# Patient Record
Sex: FEMALE | Employment: OTHER | URBAN - METROPOLITAN AREA
[De-identification: names, ages, dates, MRNs, and addresses within clinical notes are randomized per-mention and may not be internally consistent; named-entity substitution may affect disease eponyms.]

---

## 2024-06-21 RX ORDER — LOSARTAN POTASSIUM 25 MG/1
25 TABLET ORAL DAILY
Qty: 90 TABLET | OUTPATIENT
Start: 2024-06-21

## 2024-07-12 ENCOUNTER — TELEPHONE (OUTPATIENT)
Age: 69
End: 2024-07-12

## 2024-07-12 NOTE — TELEPHONE ENCOUNTER
----- Message from Johanna Jernigan RN sent at 7/11/2024  3:48 PM EDT -----  Regarding: Cardiac clearance  Hey,    This patient left a voicemail stating that she is having spinal surgery (ANTERIOR CERVICAL DISCECTOMY & FUSION  WITH PLATING C4-C7) next Wednesday and needs clearance to hold aspirin and lasix.    Please advise.    Johanna Jernigan RN  Clinical Coordinator   [facility] at [facility]

## 2024-07-12 NOTE — TELEPHONE ENCOUNTER
Verbal order and read back per Omar Johnson MD  Low to moderate risk for procedure on Wednesday the 17th .   Can hold aspirin starting today   Can hold lasix the morning of procedure.     Left message on voice mail

## 2024-07-15 NOTE — TELEPHONE ENCOUNTER
Patient left voicemail requesting call back.     Explained to her the following:  Hold aspirin starting today and resume following procedure.  Hold lasix the morning of procedure. Resume following procedure.    She verbalized understanding.

## 2024-07-17 PROBLEM — M48.02 CERVICAL STENOSIS OF SPINE: Status: ACTIVE | Noted: 2024-07-17

## 2024-07-24 ENCOUNTER — OFFICE VISIT (OUTPATIENT)
Age: 69
End: 2024-07-24

## 2024-07-24 VITALS
HEIGHT: 69 IN | DIASTOLIC BLOOD PRESSURE: 70 MMHG | BODY MASS INDEX: 29.33 KG/M2 | OXYGEN SATURATION: 100 % | WEIGHT: 198 LBS | SYSTOLIC BLOOD PRESSURE: 110 MMHG | HEART RATE: 78 BPM

## 2024-07-24 DIAGNOSIS — I77.4 CELIAC ARTERY COMPRESSION SYNDROME (HCC): ICD-10-CM

## 2024-07-24 DIAGNOSIS — R94.39 ABNORMAL RESULT OF OTHER CARDIOVASCULAR FUNCTION STUDY: ICD-10-CM

## 2024-07-24 DIAGNOSIS — R06.02 SHORTNESS OF BREATH: ICD-10-CM

## 2024-07-24 DIAGNOSIS — E78.5 HYPERLIPIDEMIA, UNSPECIFIED HYPERLIPIDEMIA TYPE: ICD-10-CM

## 2024-07-24 DIAGNOSIS — I25.10 ATHEROSCLEROSIS OF NATIVE CORONARY ARTERY OF NATIVE HEART WITHOUT ANGINA PECTORIS: Primary | ICD-10-CM

## 2024-07-24 DIAGNOSIS — R00.2 PALPITATIONS: ICD-10-CM

## 2024-07-24 DIAGNOSIS — I10 ESSENTIAL (PRIMARY) HYPERTENSION: ICD-10-CM

## 2024-07-24 NOTE — PROGRESS NOTES
Brad Hays presents today for No chief complaint on file.      Brad Hays preferred language for health care discussion is english/other.    Is someone accompanying this pt? yes    Is the patient using any DME equipment during OV? yes    Depression Screening:  Depression: Not at risk (4/17/2024)    PHQ-2     PHQ-2 Score: 0        Learning Assessment:  Who is the primary learner? Patient    What is the preferred language for health care of the primary learner? ENGLISH    How does the primary learner prefer to learn new concepts? DEMONSTRATION    Answered By patient    Relationship to Learner SELF           Pt currently taking Anticoagulant therapy? no    Pt currently taking Antiplatelet therapy ? Aspirin 81 mg daily      Coordination of Care:  1. Have you been to the ER, urgent care clinic since your last visit? Hospitalized since your last visit? no    2. Have you seen or consulted any other health care providers outside of the [facility] since your last visit? Include any pap smears or colon screening. no

## 2024-07-24 NOTE — PROGRESS NOTES
HISTORY OF PRESENT ILLNESS  Brad Hays  69 y.o. female     Chief Complaint   Patient presents with    Follow-up     6 month       ASSESSMENT and PLAN    The primary encounter diagnosis was Atherosclerosis of native coronary artery of native heart without angina pectoris. Diagnoses of Essential (primary) hypertension, Hyperlipidemia, unspecified hyperlipidemia type, Shortness of breath, Palpitations, Celiac artery compression syndrome (HCC), and Abnormal result of other cardiovascular function study were also pertinent to this visit.    Ms. Brad Covarrubias has known history of CAD.  She had an episode of chest pains, heaviness back in October 2020.  She was seen by her PCP and was sent directly to [facility].  She was noted to have ACS.  Ultimately, she was noted to have 95% RCA lesion which was stented to residual 0% using EMRE.  She also has history of 2-week event monitor for palpitations.  She does not know the results of the event monitor or her echocardiogram.  She is a previous smoker but none since 2011.  She has history of low back pain and sciatica.  She had nuclear scan done in October 2021.  This revealed EF 43% with global hypokinesis without significant reversible defect.  In August 2022, her nuclear scan showed EF 43% with mild 3 TID.  She had mild, small basal anteroseptal and inferoseptal fixed defect without reversible defect.  Her echocardiogram showed normal EF 55-60% with mild MR.  Because of persistent chest pain, she underwent coronary angiography in December 2022 which revealed widely patent RCA stent without significant occlusive disease greater than 50%.  Continued medical therapy was recommended.  Her echocardiogram done in January 2024 revealed EF 45-50% with mild global hypokinesis.  RVSP was measured at 36 mmHg.  In July 2024, she underwent spine surgery for spinal stenosis.  Medication regimen reviewed and current cardiac regimen will be continued.  All new testing since

## 2024-08-29 RX ORDER — FUROSEMIDE 20 MG/1
20 TABLET ORAL DAILY
Qty: 90 TABLET | Refills: 3 | OUTPATIENT
Start: 2024-08-29

## 2024-09-03 RX ORDER — FUROSEMIDE 20 MG/1
20 TABLET ORAL DAILY
Qty: 90 TABLET | Refills: 3 | OUTPATIENT
Start: 2024-09-03

## 2024-09-04 ENCOUNTER — TELEPHONE (OUTPATIENT)
Age: 69
End: 2024-09-04

## 2024-09-04 RX ORDER — POTASSIUM CHLORIDE 750 MG/1
10 CAPSULE, EXTENDED RELEASE ORAL 2 TIMES DAILY
COMMUNITY

## 2024-09-04 RX ORDER — FUROSEMIDE 40 MG
40 TABLET ORAL DAILY
Qty: 90 TABLET | Refills: 3 | Status: CANCELLED | OUTPATIENT
Start: 2024-09-04

## 2024-09-04 RX ORDER — FUROSEMIDE 40 MG
40 TABLET ORAL DAILY
Qty: 90 TABLET | Refills: 3 | Status: SHIPPED | OUTPATIENT
Start: 2024-09-04

## 2024-09-04 NOTE — TELEPHONE ENCOUNTER
----- Message from RIKI EDUARDO RN sent at 9/4/2024  4:50 AM EDT -----  Regarding: RE: meds  If she calls again please tell her that Dr Coley took it off her medication list when she was in hospital.  ----- Message -----  From: Elidia Martínez  Sent: 9/3/2024   3:05 PM EDT  To: Amanda Guevara RN  Subject: meds                                             Pt called stating the pharmacy notified her the prescription for her lasix we declined. She is wondering why. Please advise  [phone]

## 2024-09-04 NOTE — TELEPHONE ENCOUNTER
Called patient to make her aware that lasix was discontinued when she was in the hospital. She stated that she is having swelling to the bilateral lower extremities and abdomen. She is also having shortness of breath. She drinks 3 bottles of water daily and tries to limit sodium intake. She does not weigh herself daily.     She also stated that she takes her blood pressure daily and it has been elevated. Her systolic runs 150-170's. Reviewed patient's medications. She stated that she was only taking carvedilol once daily. Advised her that she should be taking it twice daily. She verbalized understanding.     Will discuss with Melvi.

## 2024-09-04 NOTE — TELEPHONE ENCOUNTER
Verbal order and read back per HI Yanez NP  Start lasix 40 mg once daily.  Make sure patient is taking potassium 20 meq daily.    This has been fully explained to the patient, who indicates understanding.  Patient stated that she is still taking potassium.  Refill request sent to provider.

## 2024-09-17 ENCOUNTER — TELEPHONE (OUTPATIENT)
Age: 69
End: 2024-09-17

## 2024-11-06 RX ORDER — PREGABALIN 100 MG/1
100 CAPSULE ORAL DAILY
COMMUNITY
Start: 2024-08-26

## 2024-11-06 RX ORDER — MULTIVIT WITH MINERALS/LUTEIN
2000 TABLET ORAL DAILY
COMMUNITY

## 2024-11-06 RX ORDER — CYCLOBENZAPRINE HCL 10 MG
10 TABLET ORAL DAILY PRN
COMMUNITY
Start: 2024-08-13

## 2024-11-06 RX ORDER — ATORVASTATIN CALCIUM 40 MG/1
40 TABLET, FILM COATED ORAL DAILY
COMMUNITY

## 2024-11-06 RX ORDER — OXYCODONE AND ACETAMINOPHEN 5; 325 MG/1; MG/1
1 TABLET ORAL EVERY 6 HOURS PRN
COMMUNITY
Start: 2024-08-20

## 2024-11-06 RX ORDER — METFORMIN HYDROCHLORIDE 500 MG/1
500 TABLET, EXTENDED RELEASE ORAL
COMMUNITY
Start: 2024-09-19

## 2024-11-06 NOTE — PROGRESS NOTES
Instructions for your procedure at [facility]      Today's Date: 11/6/2024      Patient's Name: Brda Hays      Procedure Date: November 14, 2024        Please enter the main entrance of the hospital and check-in at the  located in the lobby.      Do NOT eat or drink anything, including candy, gum, or ice chips after midnight prior to your procedure, unless it is part of your prep.  Brush your teeth before coming to the hospital.You may swish with water, but do not swallow.  No smoking/Vaping/E-Cigarettes 24 hours prior to the day of procedure.  No alcohol 24 hours prior to the day of procedure.  No recreational drugs for one week prior to the day of procedure.  Bring Photo ID, Insurance information, and Co-pay if required on day of procedure.  Bring in pertinent legal documents, such as, Medical Power of , DNR, Advance Directive, etc.  Leave all other valuables, including money/purse, at home.  Remove jewelry, including ALL body piercings, nail polish, acrylic nails, and makeup (including mascara); no lotions, powders, deodorant, and/or perfume/cologne/after shave on the skin.  Glasses and dentures may be worn to the hospital.  They must be removed prior to procedure. Please bring case/container for glasses or dentures.  11. Contacts should not be worn on day of procedure.   12. Call the office ([phone]) if you have symptoms of a cold or illness within 24-48 hours prior to your procedure.   13. AN ADULT (relative or friend 18 years or older) MUST DRIVE YOU HOME AFTER YOUR PROCEDURE.   14. Please make arrangements for a responsible adult (18 years or older) to be with you for 24 hours after your procedure.   15. TWO VISITORS will be allowed in the waiting area during your procedure.       Special Instructions:      Bring list of CURRENT medications.  Follow instructions from the office regarding Bowel Prep, Vitamins, Iron, Blood Thinners, Insulin, Seizure,  and Blood Pressure/Heart medications.    If you have a history of recreational drug use, you may be required to submit a urine sample for drug testing the day of your procedure, as some recreational drugs can interact with anesthetics and increase your surgical risk.    Any questions regarding prep, please call the office at [phone].    For any questions or concerns on the day of procedure, please call the Endo Suite at [phone].    These surgical instructions were reviewed with Brad Hays during the PAT phone call.

## 2024-11-13 ENCOUNTER — ANESTHESIA EVENT (OUTPATIENT)
Facility: HOSPITAL | Age: 69
End: 2024-11-13
Payer: MEDICARE

## 2024-11-14 ENCOUNTER — HOSPITAL ENCOUNTER (OUTPATIENT)
Facility: HOSPITAL | Age: 69
Setting detail: OUTPATIENT SURGERY
Discharge: HOME | End: 2024-11-14
Attending: INTERNAL MEDICINE | Admitting: INTERNAL MEDICINE
Payer: MEDICARE

## 2024-11-14 ENCOUNTER — ANESTHESIA (OUTPATIENT)
Facility: HOSPITAL | Age: 69
End: 2024-11-14
Payer: MEDICARE

## 2024-11-14 VITALS
HEIGHT: 70 IN | WEIGHT: 182 LBS | DIASTOLIC BLOOD PRESSURE: 65 MMHG | TEMPERATURE: 97.9 F | BODY MASS INDEX: 26.05 KG/M2 | RESPIRATION RATE: 13 BRPM | OXYGEN SATURATION: 100 % | SYSTOLIC BLOOD PRESSURE: 157 MMHG | HEART RATE: 79 BPM

## 2024-11-14 LAB
GLUCOSE BLD STRIP.AUTO-MCNC: 106 MG/DL (ref 70–110)
GLUCOSE BLD STRIP.AUTO-MCNC: 72 MG/DL (ref 70–110)
GLUCOSE BLD STRIP.AUTO-MCNC: 78 MG/DL (ref 70–110)
GLUCOSE BLD STRIP.AUTO-MCNC: 90 MG/DL (ref 70–110)

## 2024-11-14 PROCEDURE — 88305 TISSUE EXAM BY PATHOLOGIST: CPT

## 2024-11-14 PROCEDURE — 3700000000 HC ANESTHESIA ATTENDED CARE: Performed by: INTERNAL MEDICINE

## 2024-11-14 PROCEDURE — 6360000002 HC RX W HCPCS: Performed by: ANESTHESIOLOGY

## 2024-11-14 PROCEDURE — 7100000011 HC PHASE II RECOVERY - ADDTL 15 MIN: Performed by: INTERNAL MEDICINE

## 2024-11-14 PROCEDURE — 2580000003 HC RX 258: Performed by: ANESTHESIOLOGY

## 2024-11-14 PROCEDURE — 3600007502: Performed by: INTERNAL MEDICINE

## 2024-11-14 PROCEDURE — 2709999900 HC NON-CHARGEABLE SUPPLY: Performed by: INTERNAL MEDICINE

## 2024-11-14 PROCEDURE — 7100000000 HC PACU RECOVERY - FIRST 15 MIN: Performed by: INTERNAL MEDICINE

## 2024-11-14 PROCEDURE — 7100000010 HC PHASE II RECOVERY - FIRST 15 MIN: Performed by: INTERNAL MEDICINE

## 2024-11-14 PROCEDURE — 82962 GLUCOSE BLOOD TEST: CPT

## 2024-11-14 PROCEDURE — 2500000003 HC RX 250 WO HCPCS: Performed by: ANESTHESIOLOGY

## 2024-11-14 RX ORDER — LIDOCAINE HYDROCHLORIDE 20 MG/ML
INJECTION, SOLUTION EPIDURAL; INFILTRATION; INTRACAUDAL; PERINEURAL
Status: DISCONTINUED | OUTPATIENT
Start: 2024-11-14 | End: 2024-11-14 | Stop reason: SDUPTHER

## 2024-11-14 RX ORDER — SODIUM CHLORIDE 9 MG/ML
INJECTION, SOLUTION INTRAVENOUS CONTINUOUS
Status: DISCONTINUED | OUTPATIENT
Start: 2024-11-14 | End: 2024-11-14 | Stop reason: HOSPADM

## 2024-11-14 RX ORDER — LIDOCAINE HYDROCHLORIDE 10 MG/ML
1 INJECTION, SOLUTION EPIDURAL; INFILTRATION; INTRACAUDAL; PERINEURAL
Status: DISCONTINUED | OUTPATIENT
Start: 2024-11-14 | End: 2024-11-14 | Stop reason: HOSPADM

## 2024-11-14 RX ORDER — PROPOFOL 10 MG/ML
INJECTION, EMULSION INTRAVENOUS
Status: DISCONTINUED | OUTPATIENT
Start: 2024-11-14 | End: 2024-11-14 | Stop reason: SDUPTHER

## 2024-11-14 RX ORDER — SODIUM CHLORIDE, SODIUM LACTATE, POTASSIUM CHLORIDE, CALCIUM CHLORIDE 600; 310; 30; 20 MG/100ML; MG/100ML; MG/100ML; MG/100ML
INJECTION, SOLUTION INTRAVENOUS CONTINUOUS
Status: DISCONTINUED | OUTPATIENT
Start: 2024-11-14 | End: 2024-11-14 | Stop reason: HOSPADM

## 2024-11-14 RX ADMIN — PROPOFOL 100 MG: 10 INJECTION, EMULSION INTRAVENOUS at 13:44

## 2024-11-14 RX ADMIN — LIDOCAINE HYDROCHLORIDE 40 MG: 20 SOLUTION INTRAVENOUS at 13:44

## 2024-11-14 RX ADMIN — SODIUM CHLORIDE: 9 INJECTION, SOLUTION INTRAVENOUS at 12:29

## 2024-11-14 RX ADMIN — PROPOFOL 50 MG: 10 INJECTION, EMULSION INTRAVENOUS at 13:48

## 2024-11-14 ASSESSMENT — PAIN - FUNCTIONAL ASSESSMENT
PAIN_FUNCTIONAL_ASSESSMENT: NONE - DENIES PAIN
PAIN_FUNCTIONAL_ASSESSMENT: 0-10

## 2024-11-14 NOTE — H&P
[facility]  [phone]      History and Physical    Patient: Brad Hays MRN: [id number]  SSN: [id number]    YOB: 1955  Age: 69 y.o.  Sex: female      Subjective:      This is a 69 year old AA female who presents for evaluation of inability to eat solids.     7/2023 appt: History of chronic abdominal pain and irregular BMs. She has a history of chronically occluded celiac access as noted on CTA 7/2022, has been seen by vascular who recommended several options, one of which was to see IR for injection/nerve removal. She has a history of ABENA, EGD and colonoscopy as noted below, no evidence of bleeding.  She presents today complaining of continued lower abdominal pain/severe cramping worsening prior to having a BM, pain will subside after BM but does not resolve, she has associated swelling usually below her umbilicus. She states she will have 3 BMs in the mornings, stools are very soft but no diarrhea, has some increased gas. She states she can feel stool moving through the right side of the colon when the pain worsens. She has tried dicyclomine without any relief. She states she did not go to see IR. Pain has been occurring since 2016 and she wants it to stop.   She has had extensive negative work up as noted below. She has chronic back pain, takes muscle relaxers to be able to sleep at night. She takes gabapentin only as needed, will take 3 capsules to get relief.     Interval hx: Reports 5 week h/o inability to eat solids. Sxs started after her cervical fusion (7/17/24).  She reports partial paralysis of her vocal cords and now experiencing dysphagia. Saw ENT last week and confirmed the paralysis.  She has been referred to [facility].  Able to swallow liquids only.  She does regurgitate liquids as well. Reports having an EGD with dilation this year but cannot recall with which group, but thinks it was done at [facility].  She has lost 14lbs.      Reports chronic constipation.  pain     Peripheral neuropathy     PMB (postmenopausal bleeding)     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    Pure hypercholesterolemia 10/7/2009    PVD (peripheral vascular disease) (HCC)     RA (rheumatoid arthritis) (HCC)     Ringing in ears 2013    Shortness of breath     Thyroid disease     denies 11/6/24    Unspecified adverse effect of anesthesia     has woke up during on of her foot surgeries    Uterine polyp     Vitamin D deficiency     Vocal cord paralysis     left -post op    Wears contact lenses     Wears glasses     Weight loss 2014     Past Surgical History:   Procedure Laterality Date    BACK SURGERY      BUNIONECTOMY Bilateral     CARDIAC CATHETERIZATION  07/14/09    CERVICAL FUSION N/A 07/17/2024    ANTERIOR CERVICAL DISCECTOMY & FUSION  WITH PLATING C4-C7 performed by Giancarlo Mcnamara MD at [facility] MAIN OR    COLONOSCOPY N/A 10/1/2021    COLONOSCOPY, polypectomy performed by Jung Roper MD at [facility] ENDOSCOPY    COLONOSCOPY N/A 6/6/2017    COLONOSCOPY,  cold bx polypectomy performed by Kwaku John MD at [facility] ENDOSCOPY    DILATION AND CURETTAGE OF UTERUS  2021    DILATION AND CURETTAGE OF UTERUS N/A 06/07/2023    HYSTEROSCOPY; DILATION & CURETTAGE performed by Jazmin Roper MD at [facility] MAIN OR    ELBOW ARTHROSCOP,FULL SYNOVECT Left     elbow screws    GYN      Hysterscopy    HAMMER TOE SURGERY Bilateral     NEUROLOGICAL SURGERY      implant - lumbar region    OTHER SURGICAL HISTORY      bilateral foot operation -metal both feet in toes    OTHER SURGICAL HISTORY      Neuroma surgeries    OTHER SURGICAL HISTORY  2020    HEART STENT    OVARY REMOVAL      left    ROTATOR CUFF REPAIR Right     shoulder-has metal      Family History   Problem Relation Age of Onset    Cancer Mother         OVATIAN    Diabetes Father     Hypertension Father     Asthma Sister     Cancer Sister     No Known Problems Brother     No Known Problems Maternal Aunt     No Known Problems Maternal Uncle     No Known

## 2024-11-14 NOTE — ANESTHESIA PRE PROCEDURE
Anesthesia Plan      MAC     ASA 3       Induction: intravenous.      Anesthetic plan and risks discussed with patient.                    COLIN ALVAREZ MD   11/14/2024

## 2024-11-14 NOTE — ANESTHESIA POSTPROCEDURE EVALUATION
Department of Anesthesiology  Postprocedure Note    Patient: Brad Hays  MRN: [id number]  YOB: 1955  Date of evaluation: 11/14/2024    Procedure Summary       Date: 11/14/24 Room / Location: [facility] ENDO 02 / [facility] ENDOSCOPY    Anesthesia Start: 1340 Anesthesia Stop: 1400    Procedure: ESOPHAGOGASTRODUODENOSCOPY w/ BX and dilation Diagnosis:       Dysphagia, unspecified type      Esophageal obstruction      Celiac artery compression syndrome (HCC)      Heartburn      Constipation, unspecified constipation type      Personal history of colon polyps, unspecified      Hepatic steatosis      Abnormal weight loss      (Dysphagia, unspecified type [R13.10])      (Esophageal obstruction [K22.2])      (Celiac artery compression syndrome (HCC) [I77.4])      (Heartburn [R12])      (Constipation, unspecified constipation type [K59.00])      (Personal history of colon polyps, unspecified [Z86.0100])      (Hepatic steatosis [K76.0])      (Abnormal weight loss [R63.4])    Surgeons: Courtney Drake MD Responsible Provider: Katy Pimentel MD    Anesthesia Type: MAC ASA Status: 3            Anesthesia Type: No value filed.    Agustina Phase I: Agustina Score: 10    Agustina Phase II: Agustina Score: 10    Anesthesia Post Evaluation    Patient location during evaluation: PACU  Patient participation: complete - patient participated  Level of consciousness: awake and alert  Pain score: 0  Airway patency: patent  Nausea & Vomiting: no nausea and no vomiting  Cardiovascular status: hemodynamically stable  Respiratory status: acceptable  Hydration status: euvolemic  Multimodal analgesia pain management approach  Pain management: adequate    No notable events documented.

## 2024-11-21 ENCOUNTER — OFFICE VISIT (OUTPATIENT)
Age: 69
End: 2024-11-21

## 2024-11-21 VITALS — BODY MASS INDEX: 26.88 KG/M2 | HEIGHT: 69 IN

## 2024-11-21 DIAGNOSIS — M17.11 UNILATERAL PRIMARY OSTEOARTHRITIS, RIGHT KNEE: Primary | ICD-10-CM

## 2024-11-21 PROCEDURE — 1125F AMNT PAIN NOTED PAIN PRSNT: CPT | Performed by: PHYSICIAN ASSISTANT

## 2024-11-21 PROCEDURE — 1160F RVW MEDS BY RX/DR IN RCRD: CPT | Performed by: PHYSICIAN ASSISTANT

## 2024-11-21 PROCEDURE — 99214 OFFICE O/P EST MOD 30 MIN: CPT | Performed by: PHYSICIAN ASSISTANT

## 2024-11-21 PROCEDURE — 20611 DRAIN/INJ JOINT/BURSA W/US: CPT | Performed by: PHYSICIAN ASSISTANT

## 2024-11-21 PROCEDURE — 1159F MED LIST DOCD IN RCRD: CPT | Performed by: PHYSICIAN ASSISTANT

## 2024-11-21 PROCEDURE — 1123F ACP DISCUSS/DSCN MKR DOCD: CPT | Performed by: PHYSICIAN ASSISTANT

## 2024-11-21 RX ORDER — BETAMETHASONE SODIUM PHOSPHATE AND BETAMETHASONE ACETATE 3; 3 MG/ML; MG/ML
6 INJECTION, SUSPENSION INTRA-ARTICULAR; INTRALESIONAL; INTRAMUSCULAR; SOFT TISSUE ONCE
Status: COMPLETED | OUTPATIENT
Start: 2024-11-21 | End: 2024-11-21

## 2024-11-21 RX ADMIN — BETAMETHASONE SODIUM PHOSPHATE AND BETAMETHASONE ACETATE 6 MG: 3; 3 INJECTION, SUSPENSION INTRA-ARTICULAR; INTRALESIONAL; INTRAMUSCULAR; SOFT TISSUE at 14:31
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
## 2024-12-05 ENCOUNTER — OFFICE VISIT (OUTPATIENT)
Age: 69
End: 2024-12-05

## 2024-12-05 VITALS
OXYGEN SATURATION: 96 % | RESPIRATION RATE: 16 BRPM | HEART RATE: 82 BPM | DIASTOLIC BLOOD PRESSURE: 80 MMHG | BODY MASS INDEX: 27.55 KG/M2 | TEMPERATURE: 98 F | WEIGHT: 186 LBS | HEIGHT: 69 IN | SYSTOLIC BLOOD PRESSURE: 146 MMHG

## 2024-12-05 DIAGNOSIS — R06.02 SHORTNESS OF BREATH: Primary | ICD-10-CM

## 2024-12-05 DIAGNOSIS — Z87.891 FORMER SMOKER: ICD-10-CM

## 2024-12-05 DIAGNOSIS — G47.19 EXCESSIVE DAYTIME SLEEPINESS: ICD-10-CM

## 2024-12-05 DIAGNOSIS — I27.20 PULMONARY HYPERTENSION (HCC): ICD-10-CM

## 2024-12-05 DIAGNOSIS — R06.81 WITNESSED EPISODE OF APNEA: ICD-10-CM

## 2024-12-05 RX ORDER — ALBUTEROL SULFATE 90 UG/1
2 INHALANT RESPIRATORY (INHALATION) EVERY 6 HOURS PRN
Qty: 18 G | Refills: 3 | Status: SHIPPED | OUTPATIENT
Start: 2024-12-05

## 2024-12-05 RX ORDER — BUDESONIDE AND FORMOTEROL FUMARATE DIHYDRATE 80; 4.5 UG/1; UG/1
2 AEROSOL RESPIRATORY (INHALATION) 2 TIMES DAILY
Qty: 10.2 G | Refills: 3 | Status: SHIPPED | OUTPATIENT
Start: 2024-12-05

## 2024-12-05 NOTE — PROGRESS NOTES
Brad Hays presents today for   Chief Complaint   Patient presents with    Shortness of Breath       Is someone accompanying this pt? No    Is the patient using any DME equipment during OV? No    -DME Company No    Depression Screenin/24/2024     8:07 AM   PHQ-9 Questionaire   Little interest or pleasure in doing things 0   Feeling down, depressed, or hopeless 0   Trouble falling or staying asleep, or sleeping too much 0   Feeling tired or having little energy 0   Poor appetite or overeating 0   Feeling bad about yourself - or that you are a failure or have let yourself or your family down 0   Trouble concentrating on things, such as reading the newspaper or watching television 0   Thoughts that you would be better off dead, or of hurting yourself in some way 0   PHQ-9 Total Score 0       Learning Assessment:    Failed to redirect to the Timeline version of the [employer] SmartLink.    Abuse Screening:         No data to display                Fall Risk    Failed to redirect to the Timeline version of the [employer] SmartLink.    Coordination of Care:    1. Have you been to the ER, urgent care clinic since your last visit? Hospitalized since your last visit? No    2. Have you seen or consulted any other health care providers outside of the [facility] since your last visit? Include any pap smears or colon screening. No    Medication list has been update per patient.    
provider.     Mukesh Trinidad,   12/5/2024  Pulmonary, Critical Care Medicine  [facility]

## 2024-12-05 NOTE — PATIENT INSTRUCTIONS
What is the plan?  -Start on Symbicort as prescribed - rinse mouth after each use  -Use Albuterol rescue inhaler only as needed    -Complete CAT/CT scan of your chest  -Complete sleep study.

## 2024-12-17 ENCOUNTER — TELEPHONE (OUTPATIENT)
Age: 69
End: 2024-12-17

## 2024-12-17 NOTE — TELEPHONE ENCOUNTER
[facility] faxed over request for cardiac clearance for colonoscopy . It has not been scheduled yet per the form.    Verbal order and read back per Omar Johnson MD  No medications to hold prior to   Low risk from a cardiac standpoint.    Faxed form back to provider

## 2024-12-22 ENCOUNTER — ANESTHESIA EVENT (OUTPATIENT)
Facility: HOSPITAL | Age: 69
End: 2024-12-22
Payer: MEDICARE

## 2024-12-23 ENCOUNTER — HOSPITAL ENCOUNTER (OUTPATIENT)
Facility: HOSPITAL | Age: 69
Setting detail: OUTPATIENT SURGERY
Discharge: HOME | End: 2024-12-23
Attending: INTERNAL MEDICINE | Admitting: INTERNAL MEDICINE
Payer: MEDICARE

## 2024-12-23 ENCOUNTER — ANESTHESIA (OUTPATIENT)
Facility: HOSPITAL | Age: 69
End: 2024-12-23
Payer: MEDICARE

## 2024-12-23 VITALS
HEIGHT: 69 IN | BODY MASS INDEX: 27.44 KG/M2 | OXYGEN SATURATION: 100 % | SYSTOLIC BLOOD PRESSURE: 160 MMHG | RESPIRATION RATE: 13 BRPM | HEART RATE: 59 BPM | TEMPERATURE: 97.9 F | WEIGHT: 185.3 LBS | DIASTOLIC BLOOD PRESSURE: 66 MMHG

## 2024-12-23 LAB
GLUCOSE BLD STRIP.AUTO-MCNC: 103 MG/DL (ref 70–110)
GLUCOSE BLD STRIP.AUTO-MCNC: 118 MG/DL (ref 70–110)

## 2024-12-23 PROCEDURE — 3700000001 HC ADD 15 MINUTES (ANESTHESIA): Performed by: INTERNAL MEDICINE

## 2024-12-23 PROCEDURE — 82962 GLUCOSE BLOOD TEST: CPT

## 2024-12-23 PROCEDURE — 3600007503: Performed by: INTERNAL MEDICINE

## 2024-12-23 PROCEDURE — 2580000003 HC RX 258: Performed by: INTERNAL MEDICINE

## 2024-12-23 PROCEDURE — 7100000000 HC PACU RECOVERY - FIRST 15 MIN: Performed by: INTERNAL MEDICINE

## 2024-12-23 PROCEDURE — 7100000010 HC PHASE II RECOVERY - FIRST 15 MIN: Performed by: INTERNAL MEDICINE

## 2024-12-23 PROCEDURE — 6360000002 HC RX W HCPCS: Performed by: NURSE ANESTHETIST, CERTIFIED REGISTERED

## 2024-12-23 PROCEDURE — 3600007513: Performed by: INTERNAL MEDICINE

## 2024-12-23 PROCEDURE — 3700000000 HC ANESTHESIA ATTENDED CARE: Performed by: INTERNAL MEDICINE

## 2024-12-23 PROCEDURE — 2709999900 HC NON-CHARGEABLE SUPPLY: Performed by: INTERNAL MEDICINE

## 2024-12-23 RX ORDER — FAMOTIDINE 20 MG/1
20 TABLET, FILM COATED ORAL ONCE
Status: DISCONTINUED | OUTPATIENT
Start: 2024-12-23 | End: 2024-12-23 | Stop reason: HOSPADM

## 2024-12-23 RX ORDER — SODIUM CHLORIDE, SODIUM LACTATE, POTASSIUM CHLORIDE, CALCIUM CHLORIDE 600; 310; 30; 20 MG/100ML; MG/100ML; MG/100ML; MG/100ML
INJECTION, SOLUTION INTRAVENOUS CONTINUOUS
Status: DISCONTINUED | OUTPATIENT
Start: 2024-12-23 | End: 2024-12-23 | Stop reason: HOSPADM

## 2024-12-23 RX ORDER — SODIUM CHLORIDE 9 MG/ML
INJECTION, SOLUTION INTRAVENOUS CONTINUOUS
Status: DISCONTINUED | OUTPATIENT
Start: 2024-12-23 | End: 2024-12-23 | Stop reason: HOSPADM

## 2024-12-23 RX ORDER — LIDOCAINE HYDROCHLORIDE 20 MG/ML
INJECTION, SOLUTION EPIDURAL; INFILTRATION; INTRACAUDAL; PERINEURAL
Status: DISCONTINUED | OUTPATIENT
Start: 2024-12-23 | End: 2024-12-23 | Stop reason: SDUPTHER

## 2024-12-23 RX ORDER — PROPOFOL 10 MG/ML
INJECTION, EMULSION INTRAVENOUS
Status: DISCONTINUED | OUTPATIENT
Start: 2024-12-23 | End: 2024-12-23 | Stop reason: SDUPTHER

## 2024-12-23 RX ORDER — SODIUM CHLORIDE 0.9 % (FLUSH) 0.9 %
5-40 SYRINGE (ML) INJECTION PRN
Status: DISCONTINUED | OUTPATIENT
Start: 2024-12-23 | End: 2024-12-23 | Stop reason: HOSPADM

## 2024-12-23 RX ORDER — LIDOCAINE HYDROCHLORIDE 10 MG/ML
1 INJECTION, SOLUTION EPIDURAL; INFILTRATION; INTRACAUDAL; PERINEURAL
Status: DISCONTINUED | OUTPATIENT
Start: 2024-12-23 | End: 2024-12-23 | Stop reason: HOSPADM

## 2024-12-23 RX ADMIN — PROPOFOL 100 MG: 10 INJECTION, EMULSION INTRAVENOUS at 13:03

## 2024-12-23 RX ADMIN — PROPOFOL 30 MG: 10 INJECTION, EMULSION INTRAVENOUS at 13:12

## 2024-12-23 RX ADMIN — PROPOFOL 20 MG: 10 INJECTION, EMULSION INTRAVENOUS at 13:16

## 2024-12-23 RX ADMIN — PROPOFOL 20 MG: 10 INJECTION, EMULSION INTRAVENOUS at 13:19

## 2024-12-23 RX ADMIN — PROPOFOL 50 MG: 10 INJECTION, EMULSION INTRAVENOUS at 13:05

## 2024-12-23 RX ADMIN — PROPOFOL 20 MG: 10 INJECTION, EMULSION INTRAVENOUS at 13:08

## 2024-12-23 RX ADMIN — PROPOFOL 20 MG: 10 INJECTION, EMULSION INTRAVENOUS at 13:09

## 2024-12-23 RX ADMIN — SODIUM CHLORIDE: 9 INJECTION, SOLUTION INTRAVENOUS at 11:10

## 2024-12-23 RX ADMIN — PROPOFOL 50 MG: 10 INJECTION, EMULSION INTRAVENOUS at 13:04

## 2024-12-23 RX ADMIN — LIDOCAINE HYDROCHLORIDE 100 MG: 20 SOLUTION INTRAVENOUS at 13:03

## 2024-12-23 ASSESSMENT — PAIN - FUNCTIONAL ASSESSMENT
PAIN_FUNCTIONAL_ASSESSMENT: 0-10

## 2024-12-23 NOTE — ANESTHESIA PRE PROCEDURE
Evaluation  Patient summary reviewed  Airway: Mallampati: II  TM distance: >3 FB   Neck ROM: full  Mouth opening: > = 3 FB   Dental:    (+) upper dentures and lower dentures      Pulmonary:Negative Pulmonary ROS and normal exam                               Cardiovascular:  Exercise tolerance: poor (<4 METS)  (+) hypertension: no interval change, CAD: no interval change, CABG/stent (2 stents):                  Neuro/Psych:   Negative Neuro/Psych ROS  (+) neuromuscular disease:, psychiatric history: stable with treatment             ROS comment: Fibromyalgia GI/Hepatic/Renal:   (+) GERD: no interval change          Endo/Other:    (+) Diabetes (Diet controlled), : arthritis: rheumatoid..                 Abdominal:             Vascular: negative vascular ROS.         Other Findings:       Anesthesia Plan      MAC     ASA 3       Induction: intravenous.      Anesthetic plan and risks discussed with patient.                    COLIN ALVAREZ MD   12/23/2024

## 2024-12-23 NOTE — H&P
motility do brought on by her recent cervical fusion  Esophageal obstruction. Reports EGD in 1/2024 or 2/2024. Cannot recall name, thinks it was Pilar Reis. Will try to get results  Abnormal weight loss  Celiac artery compression syndrome  Heartburn  Constipation, chronic. recommend Miralax 1-4 capfuls daily in any liquid. May also continue Metamucil daily  Personal Hx Colon Polyp, recall 102/2024  Hepatic steatosis. Will get lfts, hepatitis testing, ELF. Consider Velacur at next appt      Plan: Colonoscopy - EGD, Urgent  The indications, technique, alternatives, and potential risks and complications were discussed with the patient including, but not limited to, bleeding, perforation, missed lesions, and anesthesia complications. The patient understands the above, wishes to proceed and has given informed consent. Written patient education information was provided to the patient  Hepatic Function Panel (LFT)  Hepatitis A Virus Antibody, Total with Reflex to IgM  Hepatitis B Surface Antibody, Quantitative  Hepatitis B Surface Antigen (HBsAg) Screen, Qualitative  Hepatitis B Core Antibody, Total with Reflex to IgM  Ceruloplasmin  Mitochondrial (M2) Antibody (AMA)  Actin (Smooth Muscle) Antibody (ASMA)  Enhanced Liver Fibrosis (ELF) Test    Follow up 6 months with Dr Drake

## 2024-12-23 NOTE — ANESTHESIA POSTPROCEDURE EVALUATION
Department of Anesthesiology  Postprocedure Note    Patient: Brad Hays  MRN: [id number]  YOB: 1955  Date of evaluation: 12/23/2024    Procedure Summary       Date: 12/23/24 Room / Location: [facility] ENDO 02 / [facility] ENDOSCOPY    Anesthesia Start: 1254 Anesthesia Stop: 1327    Procedures:       COLONOSCOPY DIAGNOSTIC w polyp      ESOPHAGOGASTRODUODENOSCOPY w bx w dilation 52 FR (Upper GI Region) Diagnosis:       Dysphagia, unspecified type      Constipation, unspecified constipation type      Polyp of colon, unspecified part of colon, unspecified type      (Dysphagia, unspecified type [R13.10])      (Constipation, unspecified constipation type [K59.00])      (Polyp of colon, unspecified part of colon, unspecified type [K63.5])    Surgeons: Yoshi Garcia MD Responsible Provider: Erasmo Reed MD    Anesthesia Type: MAC ASA Status: 3            Anesthesia Type: MAC    Agustina Phase I: Agustina Score: 10    Agustina Phase II: Agustina Score: 10    Anesthesia Post Evaluation    Patient location during evaluation: bedside  Patient participation: complete - patient participated  Level of consciousness: awake  Pain score: 0  Airway patency: patent  Nausea & Vomiting: no nausea  Cardiovascular status: hemodynamically stable  Respiratory status: acceptable  Hydration status: euvolemic  Pain management: satisfactory to patient    No notable events documented.

## 2024-12-27 ENCOUNTER — OFFICE VISIT (OUTPATIENT)
Age: 69
End: 2024-12-27

## 2024-12-27 DIAGNOSIS — M17.11 UNILATERAL PRIMARY OSTEOARTHRITIS, RIGHT KNEE: Primary | ICD-10-CM

## 2024-12-27 RX ORDER — HYALURONATE SODIUM 10 MG/ML
20 SYRINGE (ML) INTRAARTICULAR ONCE
Status: COMPLETED | OUTPATIENT
Start: 2024-12-27 | End: 2024-12-27

## 2024-12-27 RX ADMIN — Medication 20 MG: at 13:11

## 2024-12-27 NOTE — PROGRESS NOTES
Patient: Brad Hays                MRN: [id number]       SSN: [id number]  YOB: 1955        AGE: 69 y.o.        SEX: female  There is no height or weight on file to calculate BMI.    PCP: Erasmo Castro MD  12/27/24        Pt presents today for their 1st euflexxa  injection for right knee .  There has been no recent fevers/chills, systemic changes, or injuries to report.    PE:  General A&Ox3, NAD  Exam of the knees reveals skin intact, no erythema, no ecchymosis, no signs for infection.  No cyanosis, clubbing, or edema present distally.  Neg calf tenderness, neg homans, no signs for DVT present.    A: right knee OA    P: The right knee was injected with 2ml euflexxa with US guided assistance without complications.  Patient was instructed on post injection care.     Chart reviewed for the following:  Matteo ELENA PA-C, have reviewed the History, Physical and updated the Allergic reactions for Brad Hays?    TIME OUT performed immediately prior to start of procedure:  Matteo ELENA PA-C, have performed the following reviews on Brad Hays prior to the start of the procedure:  ????????  * Patient was identified by name and date of birth   * Agreement on procedure being performed was verified  * Risks and Benefits explained to the patient  * Procedure site verified and marked as necessary  * Patient was positioned for comfort  * Consent was signed and verified    Time: 1:11 PM    Body part: right knee, intra-articular    Medication & Dose: 2ml euflexxa    Date of procedure: 12/27/24    Procedure performed by: JR Davian PA-C    Patient assisted by: self    How tolerated by patient: tolerated the procedure well with no complications    Post Procedural Pain Scale: 6    Comments:  [employer] using a frequency of 10MHz with a 12L-RS transducer head was used to confirm needle placement.  Ultrasound images captured using [employer] Ultrasound machine and scanned into

## 2024-12-30 ENCOUNTER — TELEPHONE (OUTPATIENT)
Age: 69
End: 2024-12-30

## 2024-12-30 NOTE — TELEPHONE ENCOUNTER
Pt called in regards to inhaler. They want to know if there is an attachment that can be ordered for them to make it easier to use. The pt says that they are not getting any benefit from the inhaler because they are having trouble breathing in. Please send to [facility] on [address] if possible. The pt has not been using inhaler because they feel it is being wasted. They are still having difficulty breathing.

## 2025-01-21 ENCOUNTER — HOSPITAL ENCOUNTER (OUTPATIENT)
Dept: SLEEP MEDICINE | Facility: HOSPITAL | Age: 70
Discharge: HOME | End: 2025-01-24
Attending: INTERNAL MEDICINE
Payer: MEDICARE

## 2025-01-21 DIAGNOSIS — G47.19 EXCESSIVE DAYTIME SLEEPINESS: ICD-10-CM

## 2025-01-21 DIAGNOSIS — R06.81 WITNESSED EPISODE OF APNEA: ICD-10-CM

## 2025-01-21 PROCEDURE — 95800 SLP STDY UNATTENDED: CPT

## 2025-01-23 DIAGNOSIS — G47.33 OSA (OBSTRUCTIVE SLEEP APNEA): Primary | ICD-10-CM

## 2025-01-24 ENCOUNTER — CLINICAL DOCUMENTATION (OUTPATIENT)
Age: 70
End: 2025-01-24

## 2025-01-24 NOTE — PROGRESS NOTES
Spoke with the patient and discussed sleep study was positive and cpap machine has been ordered.  Instructed that she needs to f/u with As[facility] 30 to 60 days once she starts using the machine.

## 2025-01-27 PROBLEM — G47.33 OSA (OBSTRUCTIVE SLEEP APNEA): Status: ACTIVE | Noted: 2025-01-27

## 2025-02-03 ENCOUNTER — TELEPHONE (OUTPATIENT)
Age: 70
End: 2025-02-03

## 2025-02-03 NOTE — TELEPHONE ENCOUNTER
Pt called regarding CPAP. She has not heard anything back regarding her machine. She wants to know who she needs to speak to or what to do next. She also wants to let Dr. Trinidad know that she is cannot do the full mask. Please call.

## 2025-02-03 NOTE — TELEPHONE ENCOUNTER
Spoke with the patient and advised the order was faxed to [employer] on 1/23 provider her with there number so she can contact them

## 2025-02-04 ENCOUNTER — TELEPHONE (OUTPATIENT)
Age: 70
End: 2025-02-04

## 2025-02-04 NOTE — TELEPHONE ENCOUNTER
Pt stated that she would like to speak with nurse in regards to switching from [employer] to another company so she can get what she need. Please advise [phone]

## 2025-02-04 NOTE — TELEPHONE ENCOUNTER
Spoke with the patient she wasn't able to speak with anyone yesterday at [facility] to check on her cpap advised I would reach out to the rep to see what I could find out.  Email sent to [facility] to check on the status

## 2025-02-05 NOTE — TELEPHONE ENCOUNTER
After maryam looked into this he said they didn't have the order -  I faxed the order to maryam and to [employer] at [phone] -  spoke with the patient to update that they didn't receive the order and it has been sent and someone should contact her in a few days.  Advised if she doesn't hear anything to let me know

## 2025-02-06 ENCOUNTER — TELEPHONE (OUTPATIENT)
Age: 70
End: 2025-02-06

## 2025-02-06 NOTE — TELEPHONE ENCOUNTER
Patient called in and stated that she is currently being seen in the ED @ [facility] for a possible Heart attack along with blood clots and other Health  issues    Patient did cancel her appt. For today with BRUCE Marcelo

## 2025-02-11 ENCOUNTER — TELEPHONE (OUTPATIENT)
Age: 70
End: 2025-02-11

## 2025-02-11 NOTE — TELEPHONE ENCOUNTER
Patient completed her first Euflexxa injection on 12/27/2024, but was admitted in the hospital and not able to complete her second and third Euflexxa injection, now patient wants to know if she can still schedule an appointment for her other two Euflexxa injections.    Patient tel [phone]

## 2025-02-12 NOTE — TELEPHONE ENCOUNTER
Advised patient JR Marcelo said she needs to restart her Euflexxa series.  She is going to call and reschedule.

## 2025-02-13 ENCOUNTER — OFFICE VISIT (OUTPATIENT)
Age: 70
End: 2025-02-13
Payer: MEDICARE

## 2025-02-13 VITALS — HEIGHT: 69 IN | BODY MASS INDEX: 27.36 KG/M2

## 2025-02-13 DIAGNOSIS — M17.11 UNILATERAL PRIMARY OSTEOARTHRITIS, RIGHT KNEE: Primary | ICD-10-CM

## 2025-02-13 PROCEDURE — 20611 DRAIN/INJ JOINT/BURSA W/US: CPT | Performed by: PHYSICIAN ASSISTANT

## 2025-02-13 PROCEDURE — 1123F ACP DISCUSS/DSCN MKR DOCD: CPT | Performed by: PHYSICIAN ASSISTANT

## 2025-02-13 PROCEDURE — 99214 OFFICE O/P EST MOD 30 MIN: CPT | Performed by: PHYSICIAN ASSISTANT

## 2025-02-13 PROCEDURE — 1160F RVW MEDS BY RX/DR IN RCRD: CPT | Performed by: PHYSICIAN ASSISTANT

## 2025-02-13 PROCEDURE — 1159F MED LIST DOCD IN RCRD: CPT | Performed by: PHYSICIAN ASSISTANT

## 2025-02-13 PROCEDURE — 1125F AMNT PAIN NOTED PAIN PRSNT: CPT | Performed by: PHYSICIAN ASSISTANT

## 2025-02-13 RX ORDER — BETAMETHASONE SODIUM PHOSPHATE AND BETAMETHASONE ACETATE 3; 3 MG/ML; MG/ML
6 INJECTION, SUSPENSION INTRA-ARTICULAR; INTRALESIONAL; INTRAMUSCULAR; SOFT TISSUE ONCE
Status: COMPLETED | OUTPATIENT
Start: 2025-02-13 | End: 2025-02-13

## 2025-02-13 RX ADMIN — BETAMETHASONE SODIUM PHOSPHATE AND BETAMETHASONE ACETATE 6 MG: 3; 3 INJECTION, SUSPENSION INTRA-ARTICULAR; INTRALESIONAL; INTRAMUSCULAR; SOFT TISSUE at 15:18
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
## 2025-02-28 ENCOUNTER — TELEPHONE (OUTPATIENT)
Age: 70
End: 2025-02-28

## 2025-02-28 NOTE — TELEPHONE ENCOUNTER
Patient called to follow up on the gel injections series discussed at her last visit. She was sick during her last series and was unable to do them and her and JRM discussed a new order and resubmitting through her insurance.     Please review and advise patient, [phone]

## 2025-02-28 NOTE — TELEPHONE ENCOUNTER
Patient needs to be scheduled with DILMA Marcelo to have new xrays before new order can be submitted.

## 2025-03-07 ENCOUNTER — TELEPHONE (OUTPATIENT)
Age: 70
End: 2025-03-07

## 2025-03-07 RX ORDER — FLUTICASONE PROPIONATE AND SALMETEROL 250; 50 UG/1; UG/1
1 POWDER RESPIRATORY (INHALATION) EVERY 12 HOURS
Qty: 60 EACH | Refills: 3 | Status: SHIPPED | OUTPATIENT
Start: 2025-03-07

## 2025-03-07 NOTE — TELEPHONE ENCOUNTER
Patient stated that stiolto is too expensive and to see if we Dr Trinidad can prescribe something more cost efficient.  Dr Trinidad sent new prescription Wixella to patient pharmacy at [facility] in [facility]. Contact patient to informed her of information about new prescription and patient is aware and no other concerns at the moment,

## 2025-03-07 NOTE — TELEPHONE ENCOUNTER
Pt called to see if she can get a coupon or samples for Symbicort. The Rx is still too expensive for her and she is currently out. Please advise.

## 2025-03-21 ENCOUNTER — OFFICE VISIT (OUTPATIENT)
Age: 70
End: 2025-03-21

## 2025-03-21 VITALS — BODY MASS INDEX: 27.47 KG/M2 | WEIGHT: 186 LBS

## 2025-03-21 DIAGNOSIS — M17.11 UNILATERAL PRIMARY OSTEOARTHRITIS, RIGHT KNEE: Primary | ICD-10-CM

## 2025-03-21 NOTE — PROGRESS NOTES
Radiographs and Results were reviewed with the patient.  Medications were reviewed with the patient. All of pt's questions and concerns were addressed.  Increasing symptoms and return precautions associated with chief complaint and evaluation were reviewed with the patient in detail.  The patient demonstrated adequate understanding.  Social determinents of health were discussed and did not alter treatment plan.            JR Davian MCCLOUD, PAGeorgieC, ATC      Note:  This note was generated with voice recognition software.  Any typographical/grammatical errors are unintentional.

## 2025-03-31 ENCOUNTER — TELEPHONE (OUTPATIENT)
Age: 70
End: 2025-03-31

## 2025-03-31 NOTE — TELEPHONE ENCOUNTER
Patient called and is requesting a call back have a few questions about her gel injections.          Please call and advise patient at   [phone]

## 2025-04-22 ENCOUNTER — OFFICE VISIT (OUTPATIENT)
Age: 70
End: 2025-04-22
Payer: MEDICARE

## 2025-04-22 VITALS
WEIGHT: 186 LBS | BODY MASS INDEX: 27.55 KG/M2 | HEART RATE: 92 BPM | HEIGHT: 69 IN | DIASTOLIC BLOOD PRESSURE: 74 MMHG | SYSTOLIC BLOOD PRESSURE: 132 MMHG | OXYGEN SATURATION: 97 %

## 2025-04-22 DIAGNOSIS — I10 ESSENTIAL (PRIMARY) HYPERTENSION: ICD-10-CM

## 2025-04-22 DIAGNOSIS — I25.10 ATHEROSCLEROSIS OF NATIVE CORONARY ARTERY OF NATIVE HEART WITHOUT ANGINA PECTORIS: Primary | ICD-10-CM

## 2025-04-22 DIAGNOSIS — R00.2 PALPITATIONS: ICD-10-CM

## 2025-04-22 DIAGNOSIS — R06.02 SHORTNESS OF BREATH: ICD-10-CM

## 2025-04-22 DIAGNOSIS — E78.5 HYPERLIPIDEMIA, UNSPECIFIED HYPERLIPIDEMIA TYPE: ICD-10-CM

## 2025-04-22 PROCEDURE — 93000 ELECTROCARDIOGRAM COMPLETE: CPT | Performed by: INTERNAL MEDICINE

## 2025-04-22 PROCEDURE — 1159F MED LIST DOCD IN RCRD: CPT | Performed by: INTERNAL MEDICINE

## 2025-04-22 PROCEDURE — 1126F AMNT PAIN NOTED NONE PRSNT: CPT | Performed by: INTERNAL MEDICINE

## 2025-04-22 PROCEDURE — 3075F SYST BP GE 130 - 139MM HG: CPT | Performed by: INTERNAL MEDICINE

## 2025-04-22 PROCEDURE — 99214 OFFICE O/P EST MOD 30 MIN: CPT | Performed by: INTERNAL MEDICINE

## 2025-04-22 PROCEDURE — 1123F ACP DISCUSS/DSCN MKR DOCD: CPT | Performed by: INTERNAL MEDICINE

## 2025-04-22 PROCEDURE — 3078F DIAST BP <80 MM HG: CPT | Performed by: INTERNAL MEDICINE

## 2025-04-22 PROCEDURE — 1160F RVW MEDS BY RX/DR IN RCRD: CPT | Performed by: INTERNAL MEDICINE

## 2025-04-22 RX ORDER — LOSARTAN POTASSIUM 100 MG/1
100 TABLET ORAL DAILY
COMMUNITY
Start: 2025-02-10

## 2025-04-22 ASSESSMENT — PATIENT HEALTH QUESTIONNAIRE - PHQ9
4. FEELING TIRED OR HAVING LITTLE ENERGY: NOT AT ALL
9. THOUGHTS THAT YOU WOULD BE BETTER OFF DEAD, OR OF HURTING YOURSELF: NOT AT ALL
SUM OF ALL RESPONSES TO PHQ QUESTIONS 1-9: 0
7. TROUBLE CONCENTRATING ON THINGS, SUCH AS READING THE NEWSPAPER OR WATCHING TELEVISION: NOT AT ALL
6. FEELING BAD ABOUT YOURSELF - OR THAT YOU ARE A FAILURE OR HAVE LET YOURSELF OR YOUR FAMILY DOWN: NOT AT ALL
SUM OF ALL RESPONSES TO PHQ QUESTIONS 1-9: 0
5. POOR APPETITE OR OVEREATING: NOT AT ALL
1. LITTLE INTEREST OR PLEASURE IN DOING THINGS: NOT AT ALL
3. TROUBLE FALLING OR STAYING ASLEEP: NOT AT ALL
8. MOVING OR SPEAKING SO SLOWLY THAT OTHER PEOPLE COULD HAVE NOTICED. OR THE OPPOSITE, BEING SO FIGETY OR RESTLESS THAT YOU HAVE BEEN MOVING AROUND A LOT MORE THAN USUAL: NOT AT ALL
2. FEELING DOWN, DEPRESSED OR HOPELESS: NOT AT ALL
SUM OF ALL RESPONSES TO PHQ QUESTIONS 1-9: 0
10. IF YOU CHECKED OFF ANY PROBLEMS, HOW DIFFICULT HAVE THESE PROBLEMS MADE IT FOR YOU TO DO YOUR WORK, TAKE CARE OF THINGS AT HOME, OR GET ALONG WITH OTHER PEOPLE: NOT DIFFICULT AT ALL
SUM OF ALL RESPONSES TO PHQ QUESTIONS 1-9: 0

## 2025-04-22 NOTE — PROGRESS NOTES
HISTORY OF PRESENT ILLNESS  Brad Hays  70 y.o. female     Chief Complaint   Patient presents with    Follow-up       ASSESSMENT and PLAN    The primary encounter diagnosis was Atherosclerosis of native coronary artery of native heart without angina pectoris. Diagnoses of Essential (primary) hypertension, Hyperlipidemia, unspecified hyperlipidemia type, Shortness of breath, and Palpitations were also pertinent to this visit.    Ms. Brad Covarrubias has known history of CAD.  She had an episode of chest pains, heaviness back in October 2020.  She was seen by her PCP and was sent directly to [facility].  She was noted to have ACS.  Ultimately, she was noted to have 95% RCA lesion which was stented to residual 0% using EMRE.  She also has history of 2-week event monitor for palpitations.  She does not know the results of the event monitor or her echocardiogram.  She is a previous smoker but none since 2011.  She has history of low back pain and sciatica.  She had nuclear scan done in October 2021.  This revealed EF 43% with global hypokinesis without significant reversible defect.  In August 2022, her nuclear scan showed EF 43% with mild TID.  She had mild, small basal anteroseptal and inferoseptal fixed defect without reversible defect.  Her echocardiogram showed normal EF 55-60% with mild MR.  Because of persistent chest pain, she underwent coronary angiography in December 2022 which revealed widely patent RCA stent without significant occlusive disease greater than 50%.  Continued medical therapy was recommended.  Her echocardiogram done in January 2024 revealed EF 45-50% with mild global hypokinesis.  RVSP was measured at 36 mmHg.  In July 2024, she underwent spine surgery for spinal stenosis, and she tolerated this from cardiac standpoint.  In February 2025, she was diagnosed with DIEGO and was started on CPAP.  Her echocardiogram at that time showed EF 45-50% with mildly reduced LV function, RVSP 39

## 2025-04-22 NOTE — PROGRESS NOTES
Brad Hays presents today for   Chief Complaint   Patient presents with    Follow-up       Brad SHAW Saúl preferred language for health care discussion is english/other.    Is someone accompanying this pt? no    Is the patient using any DME equipment during OV? no    Depression Screening:  Depression: Not at risk (4/22/2025)    PHQ-2     PHQ-2 Score: 0        Learning Assessment:  Who is the primary learner? Patient    What is the preferred language for health care of the primary learner? ENGLISH    How does the primary learner prefer to learn new concepts? DEMONSTRATION    Answered By patient    Relationship to Learner SELF           Pt currently taking Anticoagulant therapy? no    Pt currently taking Antiplatelet therapy ? aspirin      Coordination of Care:  1. Have you been to the ER, urgent care clinic since your last visit? Hospitalized since your last visit? no    2. Have you seen or consulted any other health care providers outside of the [facility] since your last visit? Include any pap smears or colon screening. no

## 2025-04-25 ENCOUNTER — TELEPHONE (OUTPATIENT)
Age: 70
End: 2025-04-25

## 2025-04-25 NOTE — TELEPHONE ENCOUNTER
Spoke to pt to clear up misunderstanding.     Pt had 1 Euflexxa injection on 12/27/24 but did not complete series due to illness. Per insurance regulation she can not begin a new series until 6/27/25.    Referral placed 3/21/25 will be run for authorization.    Pt expressed understanding.

## 2025-04-25 NOTE — TELEPHONE ENCOUNTER
Patient called back in upset because she hasn't heard anything back about the euflexxa auth.    She stated that she was told by her insurance that she should've heard something back by now and she's in a lot of pain.    Please advise.  Patient can be reached at [phone].

## 2025-06-02 RX ORDER — FAMOTIDINE 20 MG/1
20 TABLET, FILM COATED ORAL 2 TIMES DAILY
COMMUNITY
Start: 2024-07-22

## 2025-06-02 RX ORDER — POTASSIUM CHLORIDE 750 MG/1
20 CAPSULE, EXTENDED RELEASE ORAL 2 TIMES DAILY
COMMUNITY
Start: 2024-07-22

## 2025-06-02 RX ORDER — DIPHENHYDRAMINE HCL 25 MG
50 CAPSULE ORAL EVERY 6 HOURS PRN
COMMUNITY
Start: 2024-07-22

## 2025-06-02 RX ORDER — HYDROXYZINE HYDROCHLORIDE 25 MG/1
25 TABLET, FILM COATED ORAL EVERY 8 HOURS PRN
COMMUNITY
Start: 2025-04-24

## 2025-06-02 RX ORDER — CARISOPRODOL 350 MG/1
350 TABLET ORAL DAILY PRN
COMMUNITY

## 2025-06-02 RX ORDER — BENZONATATE 100 MG/1
100 CAPSULE ORAL 3 TIMES DAILY PRN
COMMUNITY
Start: 2025-01-21

## 2025-06-02 RX ORDER — LOSARTAN POTASSIUM AND HYDROCHLOROTHIAZIDE 25; 100 MG/1; MG/1
1 TABLET ORAL DAILY
COMMUNITY
Start: 2025-04-11

## 2025-06-02 RX ORDER — TRIAMCINOLONE ACETONIDE 1 MG/G
CREAM TOPICAL 2 TIMES DAILY
COMMUNITY

## 2025-06-02 RX ORDER — AMLODIPINE BESYLATE 5 MG/1
5 TABLET ORAL DAILY
COMMUNITY
Start: 2025-02-08

## 2025-06-02 RX ORDER — CHLORHEXIDINE GLUCONATE ORAL RINSE 1.2 MG/ML
15 SOLUTION DENTAL 2 TIMES DAILY
COMMUNITY
Start: 2025-04-18

## 2025-06-02 RX ORDER — NYSTATIN 100000 U/G
CREAM TOPICAL 2 TIMES DAILY
COMMUNITY
Start: 2025-01-10

## 2025-06-16 ENCOUNTER — OFFICE VISIT (OUTPATIENT)
Age: 70
End: 2025-06-16
Payer: MEDICARE

## 2025-06-16 VITALS
HEART RATE: 70 BPM | HEIGHT: 69 IN | BODY MASS INDEX: 27.08 KG/M2 | RESPIRATION RATE: 18 BRPM | OXYGEN SATURATION: 97 % | SYSTOLIC BLOOD PRESSURE: 128 MMHG | DIASTOLIC BLOOD PRESSURE: 53 MMHG | WEIGHT: 182.8 LBS | TEMPERATURE: 98.4 F

## 2025-06-16 DIAGNOSIS — I27.20 PULMONARY HYPERTENSION (HCC): ICD-10-CM

## 2025-06-16 DIAGNOSIS — R06.02 SHORTNESS OF BREATH: ICD-10-CM

## 2025-06-16 DIAGNOSIS — G47.33 OSA (OBSTRUCTIVE SLEEP APNEA): Primary | ICD-10-CM

## 2025-06-16 PROCEDURE — 1123F ACP DISCUSS/DSCN MKR DOCD: CPT | Performed by: INTERNAL MEDICINE

## 2025-06-16 PROCEDURE — 1126F AMNT PAIN NOTED NONE PRSNT: CPT | Performed by: INTERNAL MEDICINE

## 2025-06-16 PROCEDURE — 99214 OFFICE O/P EST MOD 30 MIN: CPT | Performed by: INTERNAL MEDICINE

## 2025-06-16 RX ORDER — FLUTICASONE FUROATE, UMECLIDINIUM BROMIDE AND VILANTEROL TRIFENATATE 100; 62.5; 25 UG/1; UG/1; UG/1
1 POWDER RESPIRATORY (INHALATION) DAILY
Qty: 1 EACH | Refills: 4 | Status: SHIPPED | OUTPATIENT
Start: 2025-06-16

## 2025-06-16 RX ORDER — PANTOPRAZOLE SODIUM 40 MG/1
40 TABLET, DELAYED RELEASE ORAL DAILY
COMMUNITY
Start: 2025-05-10

## 2025-06-16 RX ORDER — FLUCONAZOLE 150 MG/1
150 TABLET ORAL DAILY
COMMUNITY
Start: 2025-06-12

## 2025-06-16 RX ORDER — FLUTICASONE FUROATE, UMECLIDINIUM BROMIDE AND VILANTEROL TRIFENATATE 100; 62.5; 25 UG/1; UG/1; UG/1
1 POWDER RESPIRATORY (INHALATION) DAILY
Qty: 2 EACH | Refills: 0 | Status: SHIPPED | COMMUNITY
Start: 2025-06-16

## 2025-06-16 NOTE — PROGRESS NOTES
Brad Hays presents today for   Chief Complaint   Patient presents with    Shortness of Breath       Is someone accompanying this pt? No    Is the patient using any DME equipment during OV? Cpap    -DME [employer]    Depression Screenin/22/2025     1:47 PM   PHQ-9 Questionaire   Little interest or pleasure in doing things 0   Feeling down, depressed, or hopeless 0   Trouble falling or staying asleep, or sleeping too much 0   Feeling tired or having little energy 0   Poor appetite or overeating 0   Feeling bad about yourself - or that you are a failure or have let yourself or your family down 0   Trouble concentrating on things, such as reading the newspaper or watching television 0   Moving or speaking so slowly that other people could have noticed. Or the opposite - being so fidgety or restless that you have been moving around a lot more than usual 0   Thoughts that you would be better off dead, or of hurting yourself in some way 0   PHQ-9 Total Score 0   If you checked off any problems, how difficult have these problems made it for you to do your work, take care of things at home, or get along with other people? 0       Learning Assessment:    Failed to redirect to the Timeline version of the [employer] SmartLink.    Abuse Screening:         No data to display                Fall Risk    Failed to redirect to the Timeline version of the [employer] SmartLink.    Coordination of Care:    1. Have you been to the ER, urgent care clinic since your last visit? Hospitalized since your last visit? [facility] chest pain     2. Have you seen or consulted any other health care providers outside of the [facility] since your last visit? Include any pap smears or colon screening. No    Medication list has been update per patient.

## 2025-06-16 NOTE — PATIENT INSTRUCTIONS
What is the plan?  -Start on Trelegy ellipta inhaler as prescribed.  -Follow-up with Dentist for Oral device as recommended.  -Please follow-up with Spine surgeon and Neurology as planned.

## 2025-06-16 NOTE — PROGRESS NOTES
[facility]                                                       Pulmonary, Critical Care, and Sleep Medicine      Pulmonary Office Follow-up.    Name: Brad Hays     : 1955     Date: 2025        Subjective:   Chief complaint: SOB  Patient is a 70 y.o. female with a PMHx of CAD s/p stent, GERD, Mesenteric artery stenosis, DDD, Depression, and Positive PPD.    HPI(25):  Interim events: seen at [facility] due to persistent dyspnea and CTA was unremarkable.    Today in clinic, she states she continues to have dyspnea with exertion. Notes rare cough.   No wheezing but admits to chest tightness. No night sweats.  Notes episode of tachycardia this morning but has since resolved.  Hemoptysis: none reported.   Night time symptoms: due to DIEGO.  Tobacco - none in 14 years.   Continues to have muscle spasms in her neck, back and abdomen for which she is to follow-up with Spine surgeon (this week) and Neurology.    Current inhalers and/or respiratory treatments:  -Wixella - states she is not able to afford. States it did provide some relief when used.  -Alb rescue inhaler - using PRN.    DIEGO - states that she is unable to complete 4 hours of CPAP use nightly due to chronic pain. States she plans on [place] [facility] [place]. Tried multiple types of masks. States that she was not able to tolerate it. States that         HPI (24):  Apparently seeing ENT (24) as it apears that her vocal cords were damaged during spinal cord surgery.    Today in clinic, she states that her voice is muffled.  She states she was referred to the clinic by her Cardiologist.  Notes dyspnea with minimal exertion. Notes walking less 10 feet leads to dyspnea.  States dyspnea has been present since placement of her stents in  and has been worsening ever since.   States she was placed on lasix and then

## 2025-07-02 ENCOUNTER — TELEPHONE (OUTPATIENT)
Age: 70
End: 2025-07-02

## 2025-07-02 NOTE — TELEPHONE ENCOUNTER
Pt called to let Dr. Trinidad know that she cannot get the dental work done to hook up the machine. The dentist said they can not put the device in because she has dentures. She cannot do the hose or mask either. The pt would like to know what else there is she can do. Please advise

## 2025-07-03 ENCOUNTER — OFFICE VISIT (OUTPATIENT)
Age: 70
End: 2025-07-03

## 2025-07-03 VITALS — HEIGHT: 69 IN | BODY MASS INDEX: 26.99 KG/M2

## 2025-07-03 DIAGNOSIS — M51.370 DEGENERATION OF INTERVERTEBRAL DISC OF LUMBOSACRAL REGION WITH DISCOGENIC BACK PAIN: ICD-10-CM

## 2025-07-03 DIAGNOSIS — M17.11 UNILATERAL PRIMARY OSTEOARTHRITIS, RIGHT KNEE: Primary | ICD-10-CM

## 2025-07-03 NOTE — PROGRESS NOTES
Patient: Brad Hays                MRN: [id number]       SSN: [id number]  YOB: 1955        AGE: 70 y.o.        SEX: female  Body mass index is 26.99 kg/m².    PCP: Erasmo Castro MD  07/03/25        Pt presents today for their 1st euflexxa  injection for right knee .  There has been no recent fevers/chills, systemic changes, or injuries to report.    PE:  General A&Ox3, NAD  Exam of the knees reveals skin intact, no erythema, no ecchymosis, no signs for infection.  No cyanosis, clubbing, or edema present distally.  Neg calf tenderness, neg homans, no signs for DVT present.    A: right knee OA    P: The right knee was injected with 2ml euflexxa with US guided assistance without complications.  Patient was instructed on post injection care.     Chart reviewed for the following:  Matteo ELENA PA-C, have reviewed the History, Physical and updated the Allergic reactions for Brad Hays?    TIME OUT performed immediately prior to start of procedure:  Matteo ELENA PA-C, have performed the following reviews on Brad Hays prior to the start of the procedure:  ????????  * Patient was identified by name and date of birth   * Agreement on procedure being performed was verified  * Risks and Benefits explained to the patient  * Procedure site verified and marked as necessary  * Patient was positioned for comfort  * Consent was signed and verified    Time: 2:56 PM    Body part: right knee, intra-articular    Medication & Dose: 2ml euflexxa    Date of procedure: 07/03/25    Procedure performed by: JR Davian PA-C    Patient assisted by: self    How tolerated by patient: tolerated the procedure well with no complications    Post Procedural Pain Scale: 8    Comments:  [employer] using a frequency of 10MHz with a 12L-RS transducer head was used to confirm needle placement.  Ultrasound images captured using [employer] Ultrasound machine and scanned into patient's

## 2025-07-10 ENCOUNTER — OFFICE VISIT (OUTPATIENT)
Age: 70
End: 2025-07-10

## 2025-07-10 DIAGNOSIS — M17.11 UNILATERAL PRIMARY OSTEOARTHRITIS, RIGHT KNEE: Primary | ICD-10-CM

## 2025-07-10 NOTE — PROGRESS NOTES
Patient: Brad Hays                MRN: [id number]       SSN: [id number]  YOB: 1955        AGE: 70 y.o.        SEX: female  There is no height or weight on file to calculate BMI.    PCP: Erasmo Castro MD  07/10/25        Pt presents today for their 2nd euflexxa  injection for right knee .  There has been no recent fevers/chills, systemic changes, or injuries to report.    PE:  General A&Ox3, NAD  Exam of the knees reveals skin intact, no erythema, no ecchymosis, no signs for infection.  No cyanosis, clubbing, or edema present distally.  Neg calf tenderness, neg homans, no signs for DVT present.    A: right knee OA    P: The right knee was injected with 2ml euflexxa with US guided assistance without complications.  Patient was instructed on post injection care.     Chart reviewed for the following:  Matteo ELENA PA-C, have reviewed the History, Physical and updated the Allergic reactions for Brad Hays?    TIME OUT performed immediately prior to start of procedure:  Matteo ELENA PA-C, have performed the following reviews on Brad Hays prior to the start of the procedure:  ????????  * Patient was identified by name and date of birth   * Agreement on procedure being performed was verified  * Risks and Benefits explained to the patient  * Procedure site verified and marked as necessary  * Patient was positioned for comfort  * Consent was signed and verified    Time: 1:29 PM    Body part: right knee, intra-articular    Medication & Dose: 2ml euflexxa    Date of procedure: 07/10/25    Procedure performed by: JR Davian PA-C    Patient assisted by: self    How tolerated by patient: tolerated the procedure well with no complications    Post Procedural Pain Scale: 9    Comments:  [employer] using a frequency of 10MHz with a 12L-RS transducer head was used to confirm needle placement.  Ultrasound images captured using [employer] Ultrasound machine and scanned into

## 2025-07-17 ENCOUNTER — OFFICE VISIT (OUTPATIENT)
Age: 70
End: 2025-07-17

## 2025-07-17 DIAGNOSIS — M17.11 UNILATERAL PRIMARY OSTEOARTHRITIS, RIGHT KNEE: Primary | ICD-10-CM

## 2025-07-17 RX ORDER — MELOXICAM 15 MG/1
15 TABLET ORAL DAILY
Qty: 30 TABLET | Refills: 3 | Status: SHIPPED | OUTPATIENT
Start: 2025-07-17

## 2025-07-17 NOTE — PROGRESS NOTES
Patient: Brad Hays                MRN: [id number]       SSN: [id number]  YOB: 1955        AGE: 70 y.o.        SEX: female  There is no height or weight on file to calculate BMI.    PCP: Erasmo Castro MD  07/17/25        Pt presents today for their 3rd euflexxa  injection for right knee .  There has been no recent fevers/chills, systemic changes, or injuries to report.    PE:  General A&Ox3, NAD  Exam of the knees reveals skin intact, no erythema, no ecchymosis, no signs for infection.  No cyanosis, clubbing, or edema present distally.  Neg calf tenderness, neg homans, no signs for DVT present.    A: right knee OA    P: The right knee was injected with 2ml euflexxa with US guided assistance without complications.  Patient was instructed on post injection care.     Chart reviewed for the following:  Matteo ELENA PA-C, have reviewed the History, Physical and updated the Allergic reactions for Brad Hays?    TIME OUT performed immediately prior to start of procedure:  Matteo ELENA PA-C, have performed the following reviews on Brad Hays prior to the start of the procedure:  ????????  * Patient was identified by name and date of birth   * Agreement on procedure being performed was verified  * Risks and Benefits explained to the patient  * Procedure site verified and marked as necessary  * Patient was positioned for comfort  * Consent was signed and verified    Time: 1:02 PM    Body part: right knee, intra-articular    Medication & Dose: 2ml euflexxa    Date of procedure: 07/17/25    Procedure performed by: JR Davian PA-C    Patient assisted by: self    How tolerated by patient: tolerated the procedure well with no complications    Post Procedural Pain Scale: 7    Comments:  [employer] using a frequency of 10MHz with a 12L-RS transducer head was used to confirm needle placement.  Ultrasound images captured using [employer] Ultrasound machine and scanned into

## 2025-07-18 DIAGNOSIS — G47.33 OSA (OBSTRUCTIVE SLEEP APNEA): Primary | ICD-10-CM

## 2025-07-21 NOTE — TELEPHONE ENCOUNTER
Spoke with the patient and advised referral placed to [facility] and they will call her to set up appt

## (undated) DEVICE — SYRINGE MED 50ML LUERLOCK TIP

## (undated) DEVICE — SYRINGE MED 50ML LUERSLIP TIP

## (undated) DEVICE — SYRINGE MED 25GA 3ML L5/8IN SUBQ PLAS W/ DETACH NDL SFTY

## (undated) DEVICE — STERILE POLYISOPRENE POWDER-FREE SURGICAL GLOVES: Brand: PROTEXIS

## (undated) DEVICE — UNDERPAD INCONT W23XL36IN STD BLU POLYPR BK FLUF SFT

## (undated) DEVICE — GAUZE,SPONGE,4"X4",16PLY,STRL,LF,10/TRAY: Brand: MEDLINE

## (undated) DEVICE — CATHETER SCLERO L240CM NDL 25GA L4MM SHTH DIA2.3MM CNTRST

## (undated) DEVICE — MEDI-VAC NON-CONDUCTIVE SUCTION TUBING: Brand: CARDINAL HEALTH

## (undated) DEVICE — CATHETER SUCT TR FL TIP 14FR W/ O CTRL

## (undated) DEVICE — BASIN EMSIS 16OZ GRAPHITE PLAS KID SHP MOLD GRAD FOR ORAL

## (undated) DEVICE — YANKAUER,SMOOTH HANDLE,HIGH CAPACITY: Brand: MEDLINE INDUSTRIES, INC.

## (undated) DEVICE — BITE BLOCK ENDOSCP UNIV AD 6 TO 9.4 MM

## (undated) DEVICE — FORCEPS BX L240CM JAW DIA2.4MM ORNG L CAP W/ NDL DISP RAD

## (undated) DEVICE — SOLUTION IRRIG 1000ML H2O STRL BLT

## (undated) DEVICE — CANNULA ORIG TL CLR W FOAM CUSHIONS AND 14FT SUPL TB 3 CHN

## (undated) DEVICE — SYRINGE MED 10ML LUERLOCK TIP W/O SFTY DISP

## (undated) DEVICE — LINER SUCT CANSTR 3000CC PLAS SFT PRE ASSEMB W/OUT TBNG W/

## (undated) DEVICE — CANNULA NSL AD TBNG L14FT STD PVC O2 CRV CONN NONFLARED NSL

## (undated) DEVICE — ENDOSCOPY PUMP TUBING/ CAP SET: Brand: ERBE